# Patient Record
Sex: MALE | Race: WHITE | NOT HISPANIC OR LATINO
[De-identification: names, ages, dates, MRNs, and addresses within clinical notes are randomized per-mention and may not be internally consistent; named-entity substitution may affect disease eponyms.]

---

## 2018-03-20 ENCOUNTER — APPOINTMENT (OUTPATIENT)
Dept: ULTRASOUND IMAGING | Facility: HOSPITAL | Age: 43
End: 2018-03-20
Payer: COMMERCIAL

## 2018-03-20 ENCOUNTER — TRANSCRIPTION ENCOUNTER (OUTPATIENT)
Age: 43
End: 2018-03-20

## 2018-03-20 ENCOUNTER — OUTPATIENT (OUTPATIENT)
Dept: OUTPATIENT SERVICES | Facility: HOSPITAL | Age: 43
LOS: 1 days | End: 2018-03-20
Payer: COMMERCIAL

## 2018-03-20 PROBLEM — Z00.00 ENCOUNTER FOR PREVENTIVE HEALTH EXAMINATION: Status: ACTIVE | Noted: 2018-03-20

## 2018-03-20 PROCEDURE — 76870 US EXAM SCROTUM: CPT | Mod: 26

## 2018-03-20 PROCEDURE — 76870 US EXAM SCROTUM: CPT

## 2019-09-27 ENCOUNTER — APPOINTMENT (OUTPATIENT)
Dept: OPHTHALMOLOGY | Facility: CLINIC | Age: 44
End: 2019-09-27

## 2020-11-06 ENCOUNTER — APPOINTMENT (OUTPATIENT)
Dept: UROLOGY | Facility: CLINIC | Age: 45
End: 2020-11-06
Payer: COMMERCIAL

## 2020-11-06 ENCOUNTER — TRANSCRIPTION ENCOUNTER (OUTPATIENT)
Age: 45
End: 2020-11-06

## 2020-11-06 PROCEDURE — 99214 OFFICE O/P EST MOD 30 MIN: CPT | Mod: 95

## 2020-11-06 RX ORDER — TADALAFIL 20 MG/1
20 TABLET ORAL
Qty: 8 | Refills: 11 | Status: ACTIVE | COMMUNITY
Start: 2020-11-06 | End: 1900-01-01

## 2020-11-06 NOTE — HISTORY OF PRESENT ILLNESS
[Other Location: e.g. School (Enter Location, City,State)___] : at [unfilled], at the time of the visit. [Other Location: e.g. Home (Enter Location, City,State)___] : at [unfilled] [Verbal consent obtained from patient] : the patient, [unfilled] [FreeTextEntry1] :  The patient-doctor relationship has been established in a face to face fashion via real time video/audio HIPAA compliant communication using telemedicine software. The patient's identity has been confirmed. The patient was previously emailed a copy of the telemedicine consent. They have had a chance to review and has now given verbal consent and has requested care to be assessed and treated through telemedicine and understands there maybe limitations in this process and they may need further follow up care in the office and or hospital settings. \par Verbal consent given on 2020, at 2:40 PM, by Ruy Quinonez.\par \par This 44 YO M was last seen on 2019 for a 1 cm right caput spermatocele, a left varicocele a remote FH of [facility] and ED for which he has been using Stendra 100 mg () with good result. At that time his KASHIF was negative.\par \par Patient is seen today via [facility] to reassess these issues. He tells me that he is urinating well, with no dysuria, hematuria, nocturia or LUTS. His right spermatocele and left varicocele have also been asymptomatic during this interval. HIs paternal Grandfather, who was diagnosed with Ca Prostate in his early eighties,  this year in his late nineties of other causes. The patient has continued to use Stendra to assist with his erections. He states that without this medication, he has difficulty maintaining the erection on some occasions. With the medicine, he never has this problem.\par \par The patient continues to use a topical medicine for his acne, and is on Vitamin D and Calcium since he fractured his clavicle earlier in the year. This was surgically set with a plate that was recently removed. He also tells me that he had a KASHIF with PCP Yemi earlier in the year. The patient denies fevers, chills, nausea and or vomiting and no unexplained weight loss. \par All pertinent parts of the patient PFSH (past medical, family and social histories), laboratory, radiological studies and physician notes were reviewed prior to starting the face to face portion of the telemedicine visit. Questionnaire results were discussed with patient.\par

## 2020-11-06 NOTE — ASSESSMENT
[FreeTextEntry1] : We discussed continued use of the Stendra for his ED. we also discussed trying the generic Cialis, tadalafil, for a longer duration of action. He expressed a desire to try this medicine and it was ordered. If he finds that he is more satisfied with the Stendra, then he will notify me and we will order that medicine. \par \par We also discussed the remote FH of Ca prostate, and I again informed the patient that he should plan on screening with PSA and KASHIF at age 51, especially in view of his negative KASHIF last year by me and this year with his PCP, Yemi. FU here in 1 year. We ended the video portion of the visit at 2:57 PM. Olivia Camacho MD\par

## 2020-11-06 NOTE — LETTER BODY
[Dear  ___] : Dear ~MORALES, [Please see my note below.] : Please see my note below. [Consult Closing:] : Thank you very much for allowing me to participate in the care of this patient.  If you have any questions, please do not hesitate to contact me. [FreeTextEntry2] : Jared Bragg MD [FreeTextEntry1] : I had the pleasure of evaluation of your patient today via a telehealth virtual visit.\par  [FreeTextEntry3] : Best personal regards,\par \par Olivia\par

## 2022-10-07 ENCOUNTER — NON-APPOINTMENT (OUTPATIENT)
Age: 47
End: 2022-10-07

## 2022-11-03 ENCOUNTER — NON-APPOINTMENT (OUTPATIENT)
Age: 47
End: 2022-11-03

## 2022-11-03 ENCOUNTER — APPOINTMENT (OUTPATIENT)
Dept: UROLOGY | Facility: CLINIC | Age: 47
End: 2022-11-03

## 2022-11-03 VITALS
SYSTOLIC BLOOD PRESSURE: 173 MMHG | OXYGEN SATURATION: 99 % | BODY MASS INDEX: 25.92 KG/M2 | HEIGHT: 69 IN | WEIGHT: 175 LBS | DIASTOLIC BLOOD PRESSURE: 97 MMHG | TEMPERATURE: 97 F | HEART RATE: 88 BPM

## 2022-11-03 LAB
BILIRUB UR QL STRIP: NORMAL
CLARITY UR: CLEAR
COLLECTION METHOD: NORMAL
GLUCOSE UR-MCNC: NORMAL
HCG UR QL: 0.2 EU/DL
HGB UR QL STRIP.AUTO: NORMAL
KETONES UR-MCNC: NORMAL
LEUKOCYTE ESTERASE UR QL STRIP: NORMAL
NITRITE UR QL STRIP: NORMAL
PH UR STRIP: 5
PROT UR STRIP-MCNC: NORMAL
SP GR UR STRIP: <=1.005

## 2022-11-03 PROCEDURE — 99214 OFFICE O/P EST MOD 30 MIN: CPT

## 2022-11-03 PROCEDURE — 81003 URINALYSIS AUTO W/O SCOPE: CPT | Mod: QW

## 2022-11-03 NOTE — PHYSICAL EXAM
[General Appearance - Well Developed] : well developed [General Appearance - Well Nourished] : well nourished [Normal Appearance] : normal appearance [Well Groomed] : well groomed [General Appearance - In No Acute Distress] : no acute distress [Oriented To Time, Place, And Person] : oriented to person, place, and time [Affect] : the affect was normal [Mood] : the mood was normal [Not Anxious] : not anxious [Urethral Meatus] : meatus normal [Urinary Bladder Findings] : the bladder was normal on palpation [Scrotum] : the scrotum was normal [Testes Mass (___cm)] : there were no testicular masses [No Prostate Nodules] : no prostate nodules [Abdomen Soft] : soft [Abdomen Tenderness] : non-tender [Costovertebral Angle Tenderness] : no ~M costovertebral angle tenderness [Prostate Enlargement] : the prostate was not enlarged [Prostate Tenderness] : the prostate was not tender [FreeTextEntry1] : normal KASHIF [Edema] : no peripheral edema [] : no respiratory distress [Respiration, Rhythm And Depth] : normal respiratory rhythm and effort [Exaggerated Use Of Accessory Muscles For Inspiration] : no accessory muscle use [Normal Station and Gait] : the gait and station were normal for the patient's age [No Focal Deficits] : no focal deficits

## 2022-11-03 NOTE — LETTER BODY
[Dear  ___] : Dear  [unfilled], [Courtesy Letter:] : I had the pleasure of seeing your patient, [unfilled], in my office today. [Please see my note below.] : Please see my note below. [Consult Closing:] : Thank you very much for allowing me to participate in the care of this patient.  If you have any questions, please do not hesitate to contact me. [FreeTextEntry3] : Best Regards, \par \par Olivia Camacho MD\par

## 2022-11-03 NOTE — ASSESSMENT
[FreeTextEntry1] : I discussed the findings with Mr. Quinonez. Normal KASHIF today. No intervention is required for his stable lower urinary tract symptoms. Regarding his ED, he will continue the tadalafil 10 mg PRN, which was renewed today at [facility]. His T levels are being followed by his PCP. I have requested that send over prior values. at patient's request, we reviewed the indications for, risks, alternatives and chances for success with the use of TRT. We also reviewed the likelihood that TRT would not alone allow normal erectile function. Patient confirms that he has found this to be the case with himself.  Providing that there are no new issues, he will follow up in 1 year. Olivia Camacho MD\par

## 2022-11-03 NOTE — HISTORY OF PRESENT ILLNESS
[FreeTextEntry1] : 46 YO M seen on 2019 for a 1 cm right caput spermatocele, a left varicocele a remote FH of CaP and ED for which he has been using Stendra 100 mg () with good result. At that time his KASHIF was negative.\par \par Patient seen 2020 via [facility] to reassess these issues. He tells me that he is urinating well, with no dysuria, hematuria, nocturia or LUTS. His right spermatocele and left varicocele have also been asymptomatic during this interval. HIs paternal Grandfather, who was diagnosed with Ca Prostate in his early eighties,  this year in his late nineties of other causes. The patient has continued to use Stendra to assist with his erections. He states that without this medication, he has difficulty maintaining the erection on some occasions. With the medicine, he never has this problem.\par We discussed continued use of the Stendra for his ED. we also discussed trying the generic Cialis, tadalafil, for a longer duration of action. He expressed a desire to try this medicine and it was ordered. If he finds that he is more satisfied with the Stendra, then he will notify me and we will order that medicine. \par We also discussed the remote FH of Ca prostate, and I again informed the patient that he should plan on screening with PSA and KASHIF at age 51, especially in view of his negative KASHIF last year by me and this year with his PCP, Yemi. FU here in 1 year. \par \par Patient seen TODAY 11/3/2022 to reassess and reorder medication. He continues on the tadalafil 10 mg PRN for his ED, with satisfactory response. He reports stable voiding pattern. He recently had testosterone pellets implanted, for low testosterone. T level and CBC reportedly being drawn every 3 months. Pre treatment TT < 300 and on-treatment  by history. He will FU with the prescribing physician for this issue.\par UA trace ketone\par IPSS 3\par MICHELE 20\par NOMI 1\par \par The patient continues to use a topical medicine for his acne, and is on Vitamin D and Calcium since he fractured his clavicle earlier in the year. This was surgically set with a plate that was recently removed. He also tells me that he had a KASHIF with PCP Yemi earlier in the year. The patient denies fevers, chills, nausea and or vomiting and no unexplained weight loss. \par All pertinent parts of the patient PFSH (past medical, family and social histories), laboratory, radiological studies and physician notes were reviewed prior to starting the face to face portion of the telemedicine visit. Questionnaire results were discussed with patient.\par

## 2024-01-04 ENCOUNTER — APPOINTMENT (OUTPATIENT)
Dept: UROLOGY | Facility: CLINIC | Age: 49
End: 2024-01-04
Payer: COMMERCIAL

## 2024-01-04 VITALS
HEART RATE: 88 BPM | TEMPERATURE: 97.7 F | SYSTOLIC BLOOD PRESSURE: 161 MMHG | DIASTOLIC BLOOD PRESSURE: 97 MMHG | OXYGEN SATURATION: 100 %

## 2024-01-04 DIAGNOSIS — N52.01 ERECTILE DYSFUNCTION DUE TO ARTERIAL INSUFFICIENCY: ICD-10-CM

## 2024-01-04 DIAGNOSIS — Z80.42 FAMILY HISTORY OF MALIGNANT NEOPLASM OF PROSTATE: ICD-10-CM

## 2024-01-04 DIAGNOSIS — N43.40 SPERMATOCELE OF EPIDIDYMIS, UNSPECIFIED: ICD-10-CM

## 2024-01-04 DIAGNOSIS — I86.1 SCROTAL VARICES: ICD-10-CM

## 2024-01-04 LAB
BILIRUB UR QL STRIP: NORMAL
CLARITY UR: CLEAR
COLLECTION METHOD: NORMAL
GLUCOSE UR-MCNC: NORMAL
HCG UR QL: 0.2
HGB UR QL STRIP.AUTO: NORMAL
KETONES UR-MCNC: NORMAL
LEUKOCYTE ESTERASE UR QL STRIP: NORMAL
NITRITE UR QL STRIP: NORMAL
PH UR STRIP: 6
PROT UR STRIP-MCNC: NORMAL
SP GR UR STRIP: 1

## 2024-01-04 PROCEDURE — 81003 URINALYSIS AUTO W/O SCOPE: CPT | Mod: QW

## 2024-01-04 PROCEDURE — 99214 OFFICE O/P EST MOD 30 MIN: CPT | Mod: 25

## 2024-01-04 RX ORDER — TADALAFIL 5 MG/1
5 TABLET ORAL
Qty: 90 | Refills: 3 | Status: ACTIVE | COMMUNITY
Start: 2022-11-03 | End: 1900-01-01

## 2024-01-04 NOTE — HISTORY OF PRESENT ILLNESS
[FreeTextEntry1] : 49 YO M seen on 2019 for a 1 cm right caput spermatocele, a left varicocele a remote FH of CaP and ED for which he has been using Stendra 100 mg () with good result. At that time his KASHIF was negative.  Patient seen 2020 via [facility] to reassess these issues. He tells me that he is urinating well, with no dysuria, hematuria, nocturia or LUTS. His right spermatocele and left varicocele have also been asymptomatic during this interval. HIs paternal Grandfather, who was diagnosed with Ca Prostate in his early eighties,  this year in his late nineties of other causes. The patient has continued to use Stendra to assist with his erections. He states that without this medication, he has difficulty maintaining the erection on some occasions. With the medicine, he never has this problem. We discussed continued use of the Stendra for his ED. we also discussed trying the generic Cialis, tadalafil, for a longer duration of action. He expressed a desire to try this medicine and it was ordered. If he finds that he is more satisfied with the Stendra, then he will notify me and we will order that medicine.  We also discussed the remote FH of Ca prostate, and I again informed the patient that he should plan on screening with PSA and KASHIF at age 51, especially in view of his negative KASHIF last year by me and this year with his PCP, Yemi. FU here in 1 year.   Patient seen 11/3/2022 to reassess and reorder medication. He continues on the tadalafil 10 mg PRN for his ED, with satisfactory response. He reports stable voiding pattern. He recently had testosterone pellets implanted, for low testosterone. T level and CBC reportedly being drawn every 3 months. Pre treatment TT < 300 and on-treatment  by history. He will FU with the prescribing physician for this issue. UA trace ketone IPSS 3 MICHELE 20 NOMI 1 The patient continues to use a topical medicine for his acne, and is on Vitamin D and Calcium since he fractured his clavicle earlier in the year. This was surgically set with a plate that was recently removed. He also tells me that he had a KASHIF with PCP Yemi earlier in the year. The patient denies fevers, chills, nausea and or vomiting and no unexplained weight loss.  All pertinent parts of the patient PFSH (past medical, family and social histories), laboratory, radiological studies and physician notes were reviewed prior to starting the face to face portion of the telemedicine visit. Questionnaire results were discussed with patient. I discussed the findings with Mr. Quinonez. Normal KASHIF today. No intervention is required for his stable lower urinary tract symptoms. Regarding his ED, he will continue the tadalafil 10 mg PRN, which was renewed today at [facility]. His T levels are being followed by his PCP. I have requested that send over prior values. at patient's request, we reviewed the indications for, risks, alternatives and chances for success with the use of TRT. We also reviewed the likelihood that TRT would not alone allow normal erectile function. Patient confirms that he has found this to be the case with himself. Providing that there are no new issues, he will follow up in 1 year.  Patient seen TODAY 2024 to reassess and renew medication. He describes his urination as good with no gross hematuria and nocturia x 0-1. As for the ED, he takes only the tadalafil usually 10 mg PRN with good success and requests a refill. As for the right spermatocele, this has not changed appreciably and does not cause him any problems. Recent blood work with PCP from 9/15/2023: PSA 1.14 . UA today negative IPSS 1 NOMI 1 MICHELE 21

## 2024-01-04 NOTE — LETTER BODY
[Dear  ___] : Dear  [unfilled], [Courtesy Letter:] : I had the pleasure of seeing your patient, [unfilled], in my office today. [Please see my note below.] : Please see my note below. [Consult Closing:] : Thank you very much for allowing me to participate in the care of this patient.  If you have any questions, please do not hesitate to contact me. [FreeTextEntry3] : Best Regards,   Olivia Camacho MD

## 2024-01-04 NOTE — ASSESSMENT
[FreeTextEntry1] : We discussed today the patient's stable physical examination normal digital rectal examination and stable right spermatocele.  No intervention is needed on these issues at this time.  I reviewed PSA and testosterone levels from his PCP from September and these are both in the normal range.  We made plans to follow-up with a repeat examination in 1 year.  As for the ED, he is enjoying good response to as needed use of tadalafil and at his request this was renewed at [facility] pharmacy. Olivia Camacho MD

## 2024-01-04 NOTE — PHYSICAL EXAM
[Normal Appearance] : normal appearance [Well Groomed] : well groomed [General Appearance - In No Acute Distress] : no acute distress [Edema] : no peripheral edema [Respiration, Rhythm And Depth] : normal respiratory rhythm and effort [Exaggerated Use Of Accessory Muscles For Inspiration] : no accessory muscle use [Abdomen Soft] : soft [Abdomen Tenderness] : non-tender [Costovertebral Angle Tenderness] : no ~M costovertebral angle tenderness [Urethral Meatus] : meatus normal [Penis Abnormality] : normal circumcised penis [Urinary Bladder Findings] : the bladder was normal on palpation [Testes Tenderness] : no tenderness of the testes [Testes Mass (___cm)] : there were no testicular masses [Prostate Tenderness] : the prostate was not tender [No Prostate Nodules] : no prostate nodules [Prostate Size ___ (0-4)] : prostate size [unfilled] (scale: 0-4) [Normal Station and Gait] : the gait and station were normal for the patient's age [] : no rash [No Focal Deficits] : no focal deficits [Oriented To Time, Place, And Person] : oriented to person, place, and time [Affect] : the affect was normal [Mood] : the mood was normal [No Palpable Adenopathy] : no palpable adenopathy [de-identified] : Left epididymis normal, Right caput cyst stable to mildly larger at 1.5 cm, non tender.

## 2025-01-08 ENCOUNTER — NON-APPOINTMENT (OUTPATIENT)
Age: 50
End: 2025-01-08

## 2025-01-10 ENCOUNTER — APPOINTMENT (OUTPATIENT)
Dept: UROLOGY | Facility: CLINIC | Age: 50
End: 2025-01-10
Payer: COMMERCIAL

## 2025-01-10 VITALS
SYSTOLIC BLOOD PRESSURE: 149 MMHG | TEMPERATURE: 97.2 F | HEART RATE: 105 BPM | DIASTOLIC BLOOD PRESSURE: 82 MMHG | OXYGEN SATURATION: 97 %

## 2025-01-10 DIAGNOSIS — N43.40 SPERMATOCELE OF EPIDIDYMIS, UNSPECIFIED: ICD-10-CM

## 2025-01-10 DIAGNOSIS — Z80.42 FAMILY HISTORY OF MALIGNANT NEOPLASM OF PROSTATE: ICD-10-CM

## 2025-01-10 DIAGNOSIS — N52.01 ERECTILE DYSFUNCTION DUE TO ARTERIAL INSUFFICIENCY: ICD-10-CM

## 2025-01-10 PROCEDURE — 81003 URINALYSIS AUTO W/O SCOPE: CPT | Mod: QW

## 2025-01-10 PROCEDURE — 99214 OFFICE O/P EST MOD 30 MIN: CPT | Mod: 25
